# Patient Record
Sex: FEMALE | Race: WHITE
[De-identification: names, ages, dates, MRNs, and addresses within clinical notes are randomized per-mention and may not be internally consistent; named-entity substitution may affect disease eponyms.]

---

## 2018-07-02 ENCOUNTER — HOSPITAL ENCOUNTER (INPATIENT)
Dept: HOSPITAL 76 - ED | Age: 58
LOS: 4 days | Discharge: HOME | DRG: 871 | End: 2018-07-06
Attending: FAMILY MEDICINE | Admitting: INTERNAL MEDICINE
Payer: COMMERCIAL

## 2018-07-02 DIAGNOSIS — J45.21: ICD-10-CM

## 2018-07-02 DIAGNOSIS — E03.9: ICD-10-CM

## 2018-07-02 DIAGNOSIS — R73.9: ICD-10-CM

## 2018-07-02 DIAGNOSIS — I10: ICD-10-CM

## 2018-07-02 DIAGNOSIS — A41.9: Primary | ICD-10-CM

## 2018-07-02 DIAGNOSIS — N17.9: ICD-10-CM

## 2018-07-02 DIAGNOSIS — E83.42: ICD-10-CM

## 2018-07-02 DIAGNOSIS — J96.01: ICD-10-CM

## 2018-07-02 DIAGNOSIS — E86.0: ICD-10-CM

## 2018-07-02 DIAGNOSIS — Z96.659: ICD-10-CM

## 2018-07-02 DIAGNOSIS — R65.20: ICD-10-CM

## 2018-07-02 DIAGNOSIS — J18.9: ICD-10-CM

## 2018-07-02 LAB
ALBUMIN DIAFP-MCNC: 4.3 G/DL (ref 3.2–5.5)
ALBUMIN/GLOB SERPL: 1.1 {RATIO} (ref 1–2.2)
ALP SERPL-CCNC: 43 IU/L (ref 42–121)
ALT SERPL W P-5'-P-CCNC: 22 IU/L (ref 10–60)
ANION GAP SERPL CALCULATED.4IONS-SCNC: 10 MMOL/L (ref 6–13)
AST SERPL W P-5'-P-CCNC: 35 IU/L (ref 10–42)
BASOPHILS NFR BLD AUTO: 0 10^3/UL (ref 0–0.1)
BASOPHILS NFR BLD AUTO: 0.1 %
BILIRUB BLD-MCNC: 1 MG/DL (ref 0.2–1)
BUN SERPL-MCNC: 14 MG/DL (ref 6–20)
CALCIUM UR-MCNC: 9.5 MG/DL (ref 8.5–10.3)
CHLORIDE SERPL-SCNC: 98 MMOL/L (ref 101–111)
CLARITY UR REFRACT.AUTO: CLEAR
CO2 SERPL-SCNC: 27 MMOL/L (ref 21–32)
CREAT SERPLBLD-SCNC: 1.2 MG/DL (ref 0.4–1)
EOSINOPHIL # BLD AUTO: 0 10^3/UL (ref 0–0.7)
EOSINOPHIL NFR BLD AUTO: 0.4 %
ERYTHROCYTE [DISTWIDTH] IN BLOOD BY AUTOMATED COUNT: 13.5 % (ref 12–15)
GFRSERPLBLD MDRD-ARVRAT: 46 ML/MIN/{1.73_M2} (ref 89–?)
GLOBULIN SER-MCNC: 3.8 G/DL (ref 2.1–4.2)
GLUCOSE SERPL-MCNC: 179 MG/DL (ref 70–100)
GLUCOSE UR QL STRIP.AUTO: NEGATIVE MG/DL
HGB UR QL STRIP: 14.5 G/DL (ref 12–16)
KETONES UR QL STRIP.AUTO: NEGATIVE MG/DL
LIPASE SERPL-CCNC: 38 U/L (ref 22–51)
LYMPHOCYTES # SPEC AUTO: 0.1 10^3/UL (ref 1.5–3.5)
LYMPHOCYTES NFR BLD AUTO: 1.6 %
MCH RBC QN AUTO: 32.4 PG (ref 27–31)
MCHC RBC AUTO-ENTMCNC: 34 G/DL (ref 32–36)
MCV RBC AUTO: 95.2 FL (ref 81–99)
MONOCYTES # BLD AUTO: 0.1 10^3/UL (ref 0–1)
MONOCYTES NFR BLD AUTO: 0.9 %
NEUTROPHILS # BLD AUTO: 5.5 10^3/UL (ref 1.5–6.6)
NEUTROPHILS # SNV AUTO: 5.7 X10^3/UL (ref 4.8–10.8)
NEUTROPHILS NFR BLD AUTO: 97 %
NITRITE UR QL STRIP.AUTO: NEGATIVE
PDW BLD AUTO: 8.4 FL (ref 7.9–10.8)
PH UR STRIP.AUTO: 6 PH (ref 5–7.5)
PLATELET # BLD: 170 10^3/UL (ref 130–450)
PROT SPEC-MCNC: 8.1 G/DL (ref 6.7–8.2)
PROT UR STRIP.AUTO-MCNC: NEGATIVE MG/DL
RBC # UR STRIP.AUTO: (no result) /UL
RBC # URNS HPF: (no result) /HPF (ref 0–5)
RBC MAR: 4.49 10^6/UL (ref 4.2–5.4)
SODIUM SERPLBLD-SCNC: 135 MMOL/L (ref 135–145)
SP GR UR STRIP.AUTO: 1.01 (ref 1–1.03)
SQUAMOUS URNS QL MICRO: (no result)
UROBILINOGEN UR QL STRIP.AUTO: (no result) E.U./DL
UROBILINOGEN UR STRIP.AUTO-MCNC: NEGATIVE MG/DL

## 2018-07-02 PROCEDURE — 81003 URINALYSIS AUTO W/O SCOPE: CPT

## 2018-07-02 PROCEDURE — 87430 STREP A AG IA: CPT

## 2018-07-02 PROCEDURE — 87276 INFLUENZA A AG IF: CPT

## 2018-07-02 PROCEDURE — 80048 BASIC METABOLIC PNL TOTAL CA: CPT

## 2018-07-02 PROCEDURE — 87181 SC STD AGAR DILUTION PER AGT: CPT

## 2018-07-02 PROCEDURE — 87086 URINE CULTURE/COLONY COUNT: CPT

## 2018-07-02 PROCEDURE — 87040 BLOOD CULTURE FOR BACTERIA: CPT

## 2018-07-02 PROCEDURE — 87070 CULTURE OTHR SPECIMN AEROBIC: CPT

## 2018-07-02 PROCEDURE — 83036 HEMOGLOBIN GLYCOSYLATED A1C: CPT

## 2018-07-02 PROCEDURE — 99284 EMERGENCY DEPT VISIT MOD MDM: CPT

## 2018-07-02 PROCEDURE — 81001 URINALYSIS AUTO W/SCOPE: CPT

## 2018-07-02 PROCEDURE — 87077 CULTURE AEROBIC IDENTIFY: CPT

## 2018-07-02 PROCEDURE — 94664 DEMO&/EVAL PT USE INHALER: CPT

## 2018-07-02 PROCEDURE — 99285 EMERGENCY DEPT VISIT HI MDM: CPT

## 2018-07-02 PROCEDURE — 85025 COMPLETE CBC W/AUTO DIFF WBC: CPT

## 2018-07-02 PROCEDURE — 96365 THER/PROPH/DIAG IV INF INIT: CPT

## 2018-07-02 PROCEDURE — 71045 X-RAY EXAM CHEST 1 VIEW: CPT

## 2018-07-02 PROCEDURE — 83605 ASSAY OF LACTIC ACID: CPT

## 2018-07-02 PROCEDURE — 83735 ASSAY OF MAGNESIUM: CPT

## 2018-07-02 PROCEDURE — 84443 ASSAY THYROID STIM HORMONE: CPT

## 2018-07-02 PROCEDURE — 94761 N-INVAS EAR/PLS OXIMETRY MLT: CPT

## 2018-07-02 PROCEDURE — 96361 HYDRATE IV INFUSION ADD-ON: CPT

## 2018-07-02 PROCEDURE — 36415 COLL VENOUS BLD VENIPUNCTURE: CPT

## 2018-07-02 PROCEDURE — 83690 ASSAY OF LIPASE: CPT

## 2018-07-02 PROCEDURE — 87275 INFLUENZA B AG IF: CPT

## 2018-07-02 PROCEDURE — 80053 COMPREHEN METABOLIC PANEL: CPT

## 2018-07-02 PROCEDURE — 87205 SMEAR GRAM STAIN: CPT

## 2018-07-02 PROCEDURE — 94640 AIRWAY INHALATION TREATMENT: CPT

## 2018-07-02 RX ADMIN — METHYLPREDNISOLONE SODIUM SUCCINATE SCH MG: 40 INJECTION, POWDER, FOR SOLUTION INTRAMUSCULAR; INTRAVENOUS at 22:33

## 2018-07-02 RX ADMIN — POTASSIUM CHLORIDE, DEXTROSE MONOHYDRATE AND SODIUM CHLORIDE SCH MLS/HR: 150; 5; 900 INJECTION, SOLUTION INTRAVENOUS at 19:21

## 2018-07-02 RX ADMIN — SODIUM CHLORIDE, PRESERVATIVE FREE PRN ML: 5 INJECTION INTRAVENOUS at 22:34

## 2018-07-02 RX ADMIN — ACETAMINOPHEN PRN MG: 325 TABLET ORAL at 23:20

## 2018-07-02 RX ADMIN — BENZOCAINE AND MENTHOL PRN LOZENGE: 15; 3.6 LOZENGE ORAL at 19:20

## 2018-07-02 RX ADMIN — LEVALBUTEROL SCH MG: 1.25 SOLUTION RESPIRATORY (INHALATION) at 20:49

## 2018-07-02 NOTE — ED PHYSICIAN DOCUMENTATION
History of Present Illness





- Stated complaint


Stated Complaint: THROAT PX/VOMITING





- Chief complaint


Chief Complaint: Resp





- Additonal information


Additional information: 





hx from pt and SO


57 f


sick for 5 days


sore throat


productive cough


vomiting


now with lethargy


no fever


no diarrhea


no travel


denies preg


spouse with similar but milder sx


normally quite healthy





Review of Systems


Constitutional: reports: Fatigue.  denies: Fever


Throat: reports: Sore throat


Respiratory: reports: Dyspnea, Cough


GI: reports: Abdominal Pain (from cough), Vomiting


Musculoskeletal: reports: Back pain (from cough)


Neurologic: reports: Generalized weakness, Altered mental status


Endocrine: denies: Easy bruising / bleeding


Immunocompromised: denies: Immunocompromised





PD PAST MEDICAL HISTORY





- Past Medical History


Past Medical History: No





- Past Surgical History


Past Surgical History: Yes


Ortho: Knee replacement





- Allergies


Allergies/Adverse Reactions: 


 Allergies











Allergy/AdvReac Type Severity Reaction Status Date / Time


 


Sulfa (Sulfonamide Allergy  Rash Verified 07/02/18 11:26





Antibiotics)     














- Social History


Does the pt smoke?: No


Smoking Status: Never smoker


Does the pt drink ETOH?: Yes


ETOH Use: Wine


Does the pt have substance abuse?: No





- Immunizations


Immunizations are current?: Yes





PD ED PE NORMAL





- Vitals


Vital signs reviewed: Yes





- General


General: Alert and oriented X 3





- HEENT


HEENT: PERRL





- Neck


Neck: Supple, no meningeal sign, Other (tender cervical adenopathy no sig pain 

with tracheal manip)





- Cardiac


Cardiac: RRR





- Respiratory


Respiratory: Other (dec thom, coarse cough, ronchi thom)





- Abdomen


Abdomen: Soft, Other (mild )





- Derm


Derm: Normal color





- Extremities


Extremities: No deformity, No edema, No calf tenderness / cord





- Neuro


Neuro: Alert and oriented X 3, Other (but slow to respond and sleeps between 

questions)


Eye Opening: Spontaneous


Motor: Obeys Commands


Verbal: Oriented


GCS Score: 15





Results





- Vitals


Vitals: 


 Vital Signs - 24 hr











  07/02/18 07/02/18 07/02/18





  11:21 11:36 12:06


 


Temperature 36.9 C  


 


Heart Rate 120 H 90 93


 


Respiratory 28 H 20 19





Rate   


 


Blood Pressure 99/76 104/70 94/73


 


O2 Saturation 90 L 95 95














  07/02/18 07/02/18 07/02/18





  12:30 13:00 13:30


 


Temperature   


 


Heart Rate 81  81


 


Respiratory 19  17





Rate   


 


Blood Pressure 97/60 101/75 115/61


 


O2 Saturation 96  97














  07/02/18 07/02/18 07/02/18





  14:08 14:30 15:00


 


Temperature 37.8 C H  


 


Heart Rate  85 87


 


Respiratory  20 22





Rate   


 


Blood Pressure  127/61 125/66


 


O2 Saturation  97 90 L














  07/02/18 07/02/18 07/02/18





  15:25 16:45 16:55


 


Temperature  39.6 C H 


 


Heart Rate 89 99 


 


Respiratory 20 27 H 





Rate   


 


Blood Pressure  144/69 H 


 


O2 Saturation  90 L 88 L














  07/02/18





  17:30


 


Temperature 


 


Heart Rate 93


 


Respiratory 27 H





Rate 


 


Blood Pressure 153/75 H


 


O2 Saturation 95








 Oxygen











O2 Source                      Nasal cannula


 


Oxygen Flow Rate               2

















- Labs


Labs: 


 Microbiology











 07/02/18 12:45 Respiratory Culture - Preliminary





 Sputum 








 Laboratory Tests











  07/02/18 07/02/18 07/02/18





  00:30 11:35 11:35


 


WBC   5.7 


 


RBC   4.49 


 


Hgb   14.5 


 


Hct   42.8 


 


MCV   95.2 


 


MCH   32.4 H 


 


MCHC   34.0 


 


RDW   13.5 


 


Plt Count   170 


 


MPV   8.4 


 


Neut # (Auto)   5.5 


 


Lymph # (Auto)   0.1 L 


 


Mono # (Auto)   0.1 


 


Eos # (Auto)   0.0 


 


Baso # (Auto)   0.0 


 


Absolute Nucleated RBC   0.00 


 


Nucleated RBC %   0.0 


 


Sodium    135


 


Potassium    4.0


 


Chloride    98 L


 


Carbon Dioxide    27


 


Anion Gap    10.0


 


BUN    14


 


Creatinine    1.2 H


 


Estimated GFR (MDRD)    46 L


 


Glucose    179 H


 


Lactic Acid   


 


Calcium    9.5


 


Total Bilirubin    1.0


 


AST    35


 


ALT    22


 


Alkaline Phosphatase    43


 


Total Protein    8.1


 


Albumin    4.3


 


Globulin    3.8


 


Albumin/Globulin Ratio    1.1


 


Lipase    38


 


Urine Color   


 


Urine Clarity   


 


Urine pH   


 


Ur Specific Gravity   


 


Urine Protein   


 


Urine Glucose (UA)   


 


Urine Ketones   


 


Urine Occult Blood   


 


Urine Nitrite   


 


Urine Bilirubin   


 


Urine Urobilinogen   


 


Ur Leukocyte Esterase   


 


Urine RBC   


 


Urine WBC   


 


Ur Squamous Epith Cells   


 


Urine Bacteria   


 


Ur Microscopic Review   


 


Urine Culture Comments   


 


Group A Strep Rapid  Negative  














  07/02/18 07/02/18





  11:35 16:40


 


WBC  


 


RBC  


 


Hgb  


 


Hct  


 


MCV  


 


MCH  


 


MCHC  


 


RDW  


 


Plt Count  


 


MPV  


 


Neut # (Auto)  


 


Lymph # (Auto)  


 


Mono # (Auto)  


 


Eos # (Auto)  


 


Baso # (Auto)  


 


Absolute Nucleated RBC  


 


Nucleated RBC %  


 


Sodium  


 


Potassium  


 


Chloride  


 


Carbon Dioxide  


 


Anion Gap  


 


BUN  


 


Creatinine  


 


Estimated GFR (MDRD)  


 


Glucose  


 


Lactic Acid  1.5 


 


Calcium  


 


Total Bilirubin  


 


AST  


 


ALT  


 


Alkaline Phosphatase  


 


Total Protein  


 


Albumin  


 


Globulin  


 


Albumin/Globulin Ratio  


 


Lipase  


 


Urine Color   YELLOW


 


Urine Clarity   CLEAR


 


Urine pH   6.0


 


Ur Specific Gravity   1.010


 


Urine Protein   NEGATIVE


 


Urine Glucose (UA)   NEGATIVE


 


Urine Ketones   NEGATIVE


 


Urine Occult Blood   TRACE-INTA


 


Urine Nitrite   NEGATIVE


 


Urine Bilirubin   NEGATIVE


 


Urine Urobilinogen   0.2 (NORMAL)


 


Ur Leukocyte Esterase   SMALL H


 


Urine RBC   0-5


 


Urine WBC   6-10 H


 


Ur Squamous Epith Cells   RARE Squamous


 


Urine Bacteria   Few


 


Ur Microscopic Review   INDICATED


 


Urine Culture Comments   INDICATED


 


Group A Strep Rapid  














PD MEDICAL DECISION MAKING





- ED course


ED course: 





rpt exam after 30cc/kg fluid labs, pt still has not urinated


 CXR strep neg


still very ill appearing, 


still ronchi and dec on pulm exam, 


still too weak to sit up without assist


 states he had similar sx and continued to worsen until he got ab even 

though his xray was also neg - then he promptly improved - certainly sounds 

like pna on exam -so started rocephin zmax


also pt has hx wheezing and asthma so will try nebs





after more IVF ab nebs etc pt still too weak to walk safely, hypoxic to 88 % 

with exertion, tachypneic to 30 and now febrile





will not be able to dc





will admit for presumed pna based on pulm exam and  with same





spoke to hospitalist at 1705








- Sepsis Event


Vital Signs: 


 Vital Signs - 24 hr











  07/02/18 07/02/18 07/02/18





  11:21 11:36 12:06


 


Temperature 36.9 C  


 


Heart Rate 120 H 90 93


 


Respiratory 28 H 20 19





Rate   


 


Blood Pressure 99/76 104/70 94/73


 


O2 Saturation 90 L 95 95














  07/02/18 07/02/18 07/02/18





  12:30 13:00 13:30


 


Temperature   


 


Heart Rate 81  81


 


Respiratory 19  17





Rate   


 


Blood Pressure 97/60 101/75 115/61


 


O2 Saturation 96  97














  07/02/18 07/02/18 07/02/18





  14:08 14:30 15:00


 


Temperature 37.8 C H  


 


Heart Rate  85 87


 


Respiratory  20 22





Rate   


 


Blood Pressure  127/61 125/66


 


O2 Saturation  97 90 L














  07/02/18 07/02/18 07/02/18





  15:25 16:45 16:55


 


Temperature  39.6 C H 


 


Heart Rate 89 99 


 


Respiratory 20 27 H 





Rate   


 


Blood Pressure  144/69 H 


 


O2 Saturation  90 L 88 L














  07/02/18





  17:30


 


Temperature 


 


Heart Rate 93


 


Respiratory 27 H





Rate 


 


Blood Pressure 153/75 H


 


O2 Saturation 95








 Oxygen











O2 Source                      Nasal cannula


 


Oxygen Flow Rate               2

















Departure





- Departure


Disposition: 66 [facility] DC/Xfer


Clinical Impression: 


 Hypoxia, Weakness





Pneumonia


Qualifiers:


 Pneumonia type: due to unspecified organism Laterality: unspecified laterality 

Lung location: unspecified part of lung Qualified Code(s): J18.9 - Pneumonia, 

unspecified organism





Fever


Qualifiers:


 Fever type: unspecified Qualified Code(s): R50.9 - Fever, unspecified

## 2018-07-02 NOTE — XRAY REPORT
Procedure Date:  07/02/2018   

Accession Number:  [id number] / [id number]                    

Procedure:  XR  - Chest 1 View X-Ray CPT Code:  19036

 

FULL RESULT:

 

 

EXAM:

CHEST RADIOGRAPHY

 

EXAM DATE: 7/2/2018 12:08 PM.

 

CLINICAL HISTORY: Hypoxia. Tachycardia. Lethargy.

 

COMPARISON: None.

 

TECHNIQUE: Upright AP view.

 

FINDINGS:

Lungs/Pleura: No focal opacities evident. No pleural effusion. No 

pneumothorax.

 

Mediastinum: Within exam limitations, the cardiomediastinal contour is 

normal.

 

Other: Prior distal left clavicle resection. Small bone island in the 

proximal right humeral shaft.

 

IMPRESSION: Normal single view chest.

 

RADIA

## 2018-07-02 NOTE — HISTORY & PHYSICAL EXAMINATION
Chief Complaint





- Chief Complaint


Chief Complaint: Shortness of breath





History of Present Illness





- Admitted From


Admitted From:: Emergency department





- History Obtained From


Records Reviewed: Yes


History obtained from: Patient


Exam Limitations: None





- History of Present Illness


HPI Comment/Other: 





Patient is a 57-year-old female with a past medical history significant for 

hypothyroidism and exercise-induced asthma who presented to the emergency 

department with a chief complaint of shortness of breath.  The patient states 

that she was in her normal state of health until 2018 when she states she 

started feeling achy.  She states that a few days earlier her  was sick 

and was told he had a virus for which he received antibiotics.  She states that 

over the week she progressed from feeling achy to having a cough with 

productive sputum to having a sore throat to getting nausea and chills.  She 

states that although her  improved she was not getting better.  She 

states that her and her  [place] 

for the weekend.  She states that all weekend she felt miserable.  She states 

that she was weak could barely get out of bed.  She states that she had nausea 

and vomiting.  She states that her  went back to [place] but she was so 

ill that she could not go back.  She states that today she began experiencing 

shortness of breath and she got to a point where she could not breathe.  She 

states that she was having wheezing, cough and increasing dyspnea.  She states 

that she has never been admitted for an asthma exacerbation.  She denies any 

chest pain, orthopnea, PND, increased lower extremity swelling.  She states 

that at home she was having chills but did not take her temperature.  She 

denies any abdominal pain, diarrhea, urinary urgency, urinary frequency or 

dysuria.  She does admit to having decreased appetite.





Patient denies any headaches, blurred vision, runny nose, dizziness, difficulty 

swallowing, palpitations, joint pain, joint swelling, back pain, neck stiffness

, rashes, recent unintentional weight loss, night sweats or any focal 

neurologic deficits.





On presentation to the emergency department the patient was initially afebrile 

but tachycardic with a heart rate of 120, borderline hypotensive with a blood 

pressure of 99/76 and tachypneic with respiratory distress.  The patient was 

found to be hypoxic with oxygen saturation down to 90% on room air.  While in 

the emergency department the patient developed a fever with a temperature of 

39.6 and dropped her oxygen saturation down to 88%.  The patient underwent 

routine lab work which showed no leukocytosis but slight lymphopenia and a 

creatinine of 1.2 with no previous baseline.  The patient did appear to be 

significantly dehydrated and was given 4 L of IV fluid before she had any urine 

output.  The patient had an influenza swab that was negative and a group A 

strep that was negative.  The patient underwent a chest x-ray which was normal.

  The patient continued to have significant respiratory distress and was 

persistently hypoxic in the emergency department.  She did appear to have 

wheezes on examination and perhaps had some asthma exacerbation.  Given her 

hypoxia and borderline septic appearance the patient was admitted to the 

hospital for treatment of acute respiratory failure with hypoxia, asthma 

exacerbation and a clinical pneumonia.





History





- Past Medical History


Cardiovascular: reports: None


Respiratory: reports: Asthma


Neuro: reports: None


Endocrine/Autoimmune: reports: HyPOthyroidism


GI: reports: None


GYN: reports: None


: reports: None


HEENT: reports: None


Psych: reports: None


Musculoskeletal: reports: None


Derm: reports: None





- Past Surgical History


Ortho: reports: Other





- Family & Social History


Family History: Mother: , Alzheimer's Disease, CAD, Cancer, Father: 

, Sister: Alive and Well


Living arrangement: At home


Living Situation: With spouse/s.o.


Social History Notes: The patient lives in [place] and is originally from the 

[place] area.  She works as a  for [employer].  [place] 

[place].  She has 1 

daughter and a granddaughter.  She lives with her .  She does not smoke 

cigarettes.  She drinks 1-2 glasses of wine a night.  She denies any illicit 

drug use.





- POLST


Patient has POLST: No


POLST Status: Full Code





Meds/Allgy





- Allergies


Allergies/Adverse Reactions: 


 Allergies











Allergy/AdvReac Type Severity Reaction Status Date / Time


 


Sulfa (Sulfonamide Allergy  Rash Verified 18 11:26





Antibiotics)     














Review of Systems





- Other Findings


Other Findings: 





A comprehensive review of systems was performed the pertinent positives and 

negatives are stated above in the HPI and the remainder of the review of 

systems is negative.





Exam





- Vital Signs


Reviewed Vital Signs: Yes


Vital Signs: 





 Vital Signs x48h











  Temp Pulse Pulse Resp BP BP Pulse Ox


 


 18 20:51   90   20   


 


 18 19:31  37.2 C      


 


 07/02/18 18:45  38.9 C H   102 H  24   101/80  92


 


 18 18:00   106 H   22  129/69   93














- Physical Exam


General Appearance: positive: Alert, Moderate distress (Patient appears flushed

, ill-appearing, tachypneic and very worn out.)


Eyes Bilateral: positive: Normal inspection, PERRL, EOMI, No lid inflammation, 

Conjunctivae nml, No scleral icterus


ENT: positive: ENT inspection nml, Pharynx nml, Dry mucous membranes.  negative

: Purulent nasal drainage, Pharyngeal erythema, Oral lesions


Neck: positive: Nml inspection, Thyroid nml, No JVD, Trachea midline.  negative

: Thyromegaly, Lymphadenopathy (R), Lymphadenopathy (L), Stiff neck, Carotid 

bruit, Tracheal deviation


Respiratory: positive: Chest non-tender, Wheezes (Scattered bilaterally 

throughout the lungs), Rhonchi (Rhonchi bilaterally at bases), Other (

Tachypneic with respiratory distress)


Cardiovascular: positive: No murmur, No gallop, Tachycardia


Peripheral Pulses: positive: 2+


Abdomen: positive: Non-tender, No organomegaly, Nml bowel sounds, No 

distention.  negative: Guarding, Rebound, Hepatomegaly


Back: positive: Nml inspection.  negative: CVA tenderness (R), CVA tenderness (L

)


Skin: positive: Color nml, No rash, Warm.  negative: Cyanosis, Diaphoresis, 

Pallor


Extremities: positive: Non-tender, Full ROM, Nml appearance, No pedal edema


Neurologic/Psychiatric: positive: Oriented x3, CN's nml (2-12), Motor nml, 

Sensation nml, Mood/affect nml, Other (Appear to have generalized weakness)





Conclusion/Plan





- Problem List


(1) Acute respiratory failure with hypoxia


Conclusion/Plan: 


On presentation to the emergency department the patient was in acute 

respiratory distress with respiratory rate in the high 20s, tachycardia and was 

hypoxic down to as little as 88% on room air.  The patient was using accessory 

muscles of breathing and spiked a fever while she was in the emergency 

department.  The patient was very ill-appearing on presentation.  Patient's 

chest x-ray did not show any evidence of pneumonia.  She was very dehydrated 

and received several liters of IV fluid.  The patient was found to have 

wheezing on examination along with rhonchi concerning for asthma exacerbation 

and pneumonia.  The patient was placed on 2 L of oxygen with which oxygen 

saturation did improve up to 95%.


Patient has acute respiratory failure with hypoxia likely secondary to a 

combination of asthma exacerbation and pneumonia.  Although the patient's chest 

x-ray does not show pneumonia she clinically appears to have pneumonia given 

her fever, respiratory distress and rhonchi on examination.





Plan:


Patient will be placed on supplemental oxygen


Patient will be given IV antibiotics with ceftriaxone and azithromycin


Patient will be placed on Solu-Medrol 40 mg 3 times daily


Patient will be placed on Xopenex 4 times daily


We will monitor closely








(2) Sepsis


Conclusion/Plan: 





On presentation to the emergency department the patient appeared to be septic 

with fever of up to 38.9, tachycardia up to 120, respiratory distress with 

respiratory rate up to 28, hypoxia and dehydration with acute kidney injury.  

Although the patient's chest x-ray was negative for pneumonia she clinically 

appears to have pneumonia and this is the likely source of the patient's 

sepsis.  Patient's lactic acid was normal.





Plan:


Aggressive hydration with IV fluids


IV antibiotics with ceftriaxone and azithromycin to cover for community 

acquired pneumonia


Supplemental oxygen


Blood cultures


Qualifiers: 


   Sepsis type: sepsis due to unspecified organism   Qualified Code(s): A41.9 - 

Sepsis, unspecified organism   





(3) CAP (community acquired pneumonia)


Conclusion/Plan: 





Patient presented with acute respiratory failure with hypoxia and sepsis.  

Although patient's chest x-ray was negative on presentation she is febrile, 

tachycardic and had respiratory distress with use of accessory muscles of 

breathing and was tachypneic up to 28.  Patient had rhonchi on examination of 

her lungs.  Patient appears to clinically have community-acquired pneumonia





Plan:


IV antibiotics with ceftriaxone and azithromycin


IV fluids


Blood cultures pending


Supplemental oxygen


Repeat chest x-ray once patient is hydrated in the morning


Qualifiers: 


   Laterality: unspecified laterality   Qualified Code(s): J18.9 - Pneumonia, 

unspecified organism   





(4) Asthma exacerbation


Conclusion/Plan: 


Patient presented with acute respiratory failure with hypoxia and sepsis.  The 

patient appears to have community acquired pneumonia which looks to be 

exacerbating asthma.  The patient was wheezing on examination and was hypoxic.  

She had some mild relief with nebulizer treatment.





Plan:


Place on Xopenex 4 times daily


Solu-Medrol IV 3 times daily


Supplemental oxygen


IV antibiotics for treatment of community acquired pneumonia


Monitor closely


Qualifiers: 


   Asthma severity: mild   Asthma persistence: intermittent   Qualified Code(s)

: J45.21 - Mild intermittent asthma with (acute) exacerbation   





(5) GLORY (acute kidney injury)


Conclusion/Plan: 


We do not have a previous creatinine on the patient however assuming that she 

is healthy and previously had a normal creatinine level her creatinine is 

elevated at 1.2 on presentation.  She appeared very dehydrated on examination.  

She did not make any urine until she received 4 L of IV fluid.  She appears to 

have acute kidney injury secondary to sepsis.





Plan:


Give IV fluids


Monitor creatinine


Avoid nephrotoxic agents











(6) Hyperglycemia


Conclusion/Plan: 


Patient denies any history of diabetes or any family history of diabetes.  The 

patient on presentation is hyperglycemic with a glucose of 179.  Glucose may be 

elevated due to stress response or if the patient received steroids prior to 

the blood glucose check.  For now we will get a hemoglobin A1c and if it is 

elevated then we will place her on glucose checks and possibly sliding scale 

insulin while she is hospitalized here.








(7) Hypothyroidism


Conclusion/Plan: 


Patient has history of hypothyroidism and is on Synthroid at home.  The patient 

cannot remember her dose of Synthroid.  We will have the pharmacy confirm her 

dose of Synthroid and then start her back on it once we have confirmed.  

Currently the patient appears to be stable, we will check a TSH in the morning.


Qualifiers: 


   Hypothyroidism type: unspecified   Qualified Code(s): E03.9 - Hypothyroidism

, unspecified   





- Lab Results


Lab results reviewed: Yes


Fish Bones: 


 18 11:35





 18 11:35


Other Lab Results: 








 Laboratory Results











WBC  5.7 x10^3/uL (4.8-10.8)   18  11:35    


 


RBC  4.49 10^6/uL (4.20-5.40)   18  11:35    


 


Hgb  14.5 g/dL (12.0-16.0)   18  11:35    


 


Hct  42.8 % (37.0-47.0)   18  11:35    


 


MCV  95.2 fL (81.0-99.0)   18  11:35    


 


MCH  32.4 pg (27.0-31.0)  H  18  11:35    


 


MCHC  34.0 g/dL (32.0-36.0)   18  11:35    


 


RDW  13.5 % (12.0-15.0)   18  11:35    


 


Plt Count  170 10^3/uL (130-450)   18  11:35    


 


MPV  8.4 fL (7.9-10.8)   18  11:35    


 


Neut # (Auto)  5.5 10^3/uL (1.5-6.6)   18  11:35    


 


Lymph # (Auto)  0.1 10^3/uL (1.5-3.5)  L  18  11:35    


 


Mono # (Auto)  0.1 10^3/uL (0.0-1.0)   18  11:35    


 


Eos # (Auto)  0.0 10^3/uL (0.0-0.7)   18  11:35    


 


Baso # (Auto)  0.0 10^3/uL (0.0-0.1)   18  11:35    


 


Absolute Nucleated RBC  0.00 x10^3/uL  18  11:35    


 


Nucleated RBC %  0.0 /100WBC  18  11:35    


 


Sodium  135 mmol/L (135-145)   18  11:35    


 


Potassium  4.0 mmol/L (3.5-5.0)   18  11:35    


 


Chloride  98 mmol/L (101-111)  L  18  11:35    


 


Carbon Dioxide  27 mmol/L (21-32)   18  11:35    


 


Anion Gap  10.0  (6-13)   18  11:35    


 


BUN  14 mg/dL (6-20)   18  11:35    


 


Creatinine  1.2 mg/dL (0.4-1.0)  H  18  11:35    


 


Estimated GFR (MDRD)  46  (>89)  L  18  11:35    


 


Glucose  179 mg/dL ()  H  18  11:35    


 


Lactic Acid  1.5 mmol/L (0.5-2.2)   18  11:35    


 


Calcium  9.5 mg/dL (8.5-10.3)   18  11:35    


 


Total Bilirubin  1.0 mg/dL (0.2-1.0)   18  11:35    


 


AST  35 IU/L (10-42)   18  11:35    


 


ALT  22 IU/L (10-60)   18  11:35    


 


Alkaline Phosphatase  43 IU/L ()   18  11:35    


 


Total Protein  8.1 g/dL (6.7-8.2)   18  11:35    


 


Albumin  4.3 g/dL (3.2-5.5)   18  11:35    


 


Globulin  3.8 g/dL (2.1-4.2)   18  11:35    


 


Albumin/Globulin Ratio  1.1  (1.0-2.2)   18  11:35    


 


Lipase  38 U/L (22-51)   18  11:35    


 


Urine Color  YELLOW   18  16:40    


 


Urine Clarity  CLEAR  (CLEAR)   18  16:40    


 


Urine pH  6.0 PH (5.0-7.5)   18  16:40    


 


Ur Specific Gravity  1.010  (1.002-1.030)   18  16:40    


 


Urine Protein  NEGATIVE mg/dL (NEGATIVE)   18  16:40    


 


Urine Glucose (UA)  NEGATIVE mg/dL (NEGATIVE)   18  16:40    


 


Urine Ketones  NEGATIVE mg/dL (NEGATIVE)   18  16:40    


 


Urine Occult Blood  TRACE-INTA  (NEGATIVE)   18  16:40    


 


Urine Nitrite  NEGATIVE  (NEGATIVE)   18  16:40    


 


Urine Bilirubin  NEGATIVE  (NEGATIVE)   18  16:40    


 


Urine Urobilinogen  0.2 (NORMAL) E.U./dL (NORMAL)   18  16:40    


 


Ur Leukocyte Esterase  SMALL  (NEGATIVE)  H  18  16:40    


 


Urine RBC  0-5 /HPF (0-5)   18  16:40    


 


Urine WBC  6-10 /HPF (0-5)  H  18  16:40    


 


Ur Squamous Epith Cells  RARE Squamous  (<= Few)   18  16:40    


 


Urine Bacteria  Few /HPF (None Seen)   18  16:40    


 


Ur Microscopic Review  INDICATED   18  16:40    


 


Urine Culture Comments  INDICATED   18  16:40    


 


Influenza A (Rapid)  Negative  (Negative)   18  19:32    


 


Influenza B (Rapid)  Negative  (Negative)   18  19:32    


 


Group A Strep Rapid  Negative  (Negative)   18  00:30    














- Diagnostic Imaging Results


Diagnostic Imaging Results: positive: Final report reviewed


Diagnostic Imaging Results Comments: 





Chest x-ray


Impression:


Normal single view chest





Core Measures





- Anticipated LOS


I expect patient to be DC'd or transferred within 96 hours.: Yes





- DVT/VTE - Prophylaxis


VTE/DVT Prophylaxis med ordered at admit?: Yes

## 2018-07-03 LAB
ANION GAP SERPL CALCULATED.4IONS-SCNC: 7 MMOL/L (ref 6–13)
BASOPHILS NFR BLD AUTO: 0 10^3/UL (ref 0–0.1)
BASOPHILS NFR BLD AUTO: 0.2 %
BUN SERPL-MCNC: 21 MG/DL (ref 6–20)
CALCIUM UR-MCNC: 7 MG/DL (ref 8.5–10.3)
CHLORIDE SERPL-SCNC: 106 MMOL/L (ref 101–111)
CO2 SERPL-SCNC: 21 MMOL/L (ref 21–32)
CREAT SERPLBLD-SCNC: 1.3 MG/DL (ref 0.4–1)
EOSINOPHIL # BLD AUTO: 0 10^3/UL (ref 0–0.7)
EOSINOPHIL NFR BLD AUTO: 0 %
ERYTHROCYTE [DISTWIDTH] IN BLOOD BY AUTOMATED COUNT: 13.5 % (ref 12–15)
EST. AVERAGE GLUCOSE BLD GHB EST-MCNC: 103 MG/DL (ref 70–100)
GFRSERPLBLD MDRD-ARVRAT: 42 ML/MIN/{1.73_M2} (ref 89–?)
GLUCOSE SERPL-MCNC: 170 MG/DL (ref 70–100)
HB2 TOTAL: 13.3 G/DL
HBA1C BLD-MCNC: 0.45 G/DL
HEMOGLOBIN A1C %: 5.2 % (ref 4.6–6.2)
HGB UR QL STRIP: 12.7 G/DL (ref 12–16)
LYMPHOCYTES # SPEC AUTO: 0.1 10^3/UL (ref 1.5–3.5)
LYMPHOCYTES NFR BLD AUTO: 1.3 %
MAGNESIUM SERPL-MCNC: 1.3 MG/DL (ref 1.7–2.8)
MCH RBC QN AUTO: 32.5 PG (ref 27–31)
MCHC RBC AUTO-ENTMCNC: 33.8 G/DL (ref 32–36)
MCV RBC AUTO: 96 FL (ref 81–99)
MONOCYTES # BLD AUTO: 0.2 10^3/UL (ref 0–1)
MONOCYTES NFR BLD AUTO: 2.6 %
NEUTROPHILS # BLD AUTO: 7.2 10^3/UL (ref 1.5–6.6)
NEUTROPHILS # SNV AUTO: 7.5 X10^3/UL (ref 4.8–10.8)
NEUTROPHILS NFR BLD AUTO: 95.9 %
PDW BLD AUTO: 8.5 FL (ref 7.9–10.8)
PLATELET # BLD: 103 10^3/UL (ref 130–450)
RBC MAR: 3.9 10^6/UL (ref 4.2–5.4)
SODIUM SERPLBLD-SCNC: 134 MMOL/L (ref 135–145)

## 2018-07-03 RX ADMIN — LEVALBUTEROL SCH MG: 1.25 SOLUTION RESPIRATORY (INHALATION) at 10:57

## 2018-07-03 RX ADMIN — METHYLPREDNISOLONE SODIUM SUCCINATE SCH MG: 40 INJECTION, POWDER, FOR SOLUTION INTRAMUSCULAR; INTRAVENOUS at 14:46

## 2018-07-03 RX ADMIN — BENZOCAINE AND MENTHOL PRN LOZENGE: 15; 3.6 LOZENGE ORAL at 01:02

## 2018-07-03 RX ADMIN — LEVALBUTEROL SCH: 1.25 SOLUTION RESPIRATORY (INHALATION) at 21:55

## 2018-07-03 RX ADMIN — SODIUM CHLORIDE, PRESERVATIVE FREE SCH: 5 INJECTION INTRAVENOUS at 17:04

## 2018-07-03 RX ADMIN — ACETAMINOPHEN PRN MG: 325 TABLET ORAL at 04:35

## 2018-07-03 RX ADMIN — METHYLPREDNISOLONE SODIUM SUCCINATE SCH MG: 40 INJECTION, POWDER, FOR SOLUTION INTRAMUSCULAR; INTRAVENOUS at 06:29

## 2018-07-03 RX ADMIN — MAGNESIUM SULFATE HEPTAHYDRATE SCH MLS/HR: 2 INJECTION, SOLUTION INTRAVENOUS at 09:15

## 2018-07-03 RX ADMIN — FAMOTIDINE SCH MG: 20 TABLET, FILM COATED ORAL at 11:49

## 2018-07-03 RX ADMIN — MAGNESIUM SULFATE HEPTAHYDRATE SCH MLS/HR: 2 INJECTION, SOLUTION INTRAVENOUS at 08:11

## 2018-07-03 RX ADMIN — POTASSIUM CHLORIDE, DEXTROSE MONOHYDRATE AND SODIUM CHLORIDE SCH MLS/HR: 150; 5; 900 INJECTION, SOLUTION INTRAVENOUS at 06:59

## 2018-07-03 RX ADMIN — DEXTROSE MONOHYDRATE SCH MLS/HR: 50 INJECTION, SOLUTION INTRAVENOUS at 14:46

## 2018-07-03 RX ADMIN — SODIUM CHLORIDE, PRESERVATIVE FREE SCH: 5 INJECTION INTRAVENOUS at 07:26

## 2018-07-03 RX ADMIN — SODIUM CHLORIDE, PRESERVATIVE FREE SCH: 5 INJECTION INTRAVENOUS at 00:53

## 2018-07-03 RX ADMIN — LEVALBUTEROL SCH MG: 1.25 SOLUTION RESPIRATORY (INHALATION) at 07:29

## 2018-07-03 RX ADMIN — METHYLPREDNISOLONE SODIUM SUCCINATE SCH MG: 40 INJECTION, POWDER, FOR SOLUTION INTRAMUSCULAR; INTRAVENOUS at 21:48

## 2018-07-03 RX ADMIN — ACETAMINOPHEN PRN MG: 325 TABLET ORAL at 11:48

## 2018-07-03 RX ADMIN — SODIUM CHLORIDE SCH MLS/HR: 9 INJECTION, SOLUTION INTRAVENOUS at 13:17

## 2018-07-03 RX ADMIN — BENZOCAINE AND MENTHOL PRN LOZENGE: 15; 3.6 LOZENGE ORAL at 11:48

## 2018-07-03 RX ADMIN — Medication SCH MG: at 11:48

## 2018-07-03 RX ADMIN — SODIUM CHLORIDE, PRESERVATIVE FREE PRN ML: 5 INJECTION INTRAVENOUS at 06:30

## 2018-07-03 RX ADMIN — LEVALBUTEROL SCH MG: 1.25 SOLUTION RESPIRATORY (INHALATION) at 14:51

## 2018-07-03 NOTE — XRAY REPORT
Procedure Date:  07/03/2018   

Accession Number:  [id number] / [id number]                    

Procedure:  XR  - Chest 1 View X-Ray CPT Code:  09853

 

FULL RESULT:

 

 

EXAM: Chest 1 View X-Ray

 

DATE: 7/3/2018 9:08 AM

 

CLINICAL HISTORY: F/U SOB, poss pneumonia

 

COMPARISON: None.

 

TECHNIQUE: Single view of the chest.

 

FINDINGS:

Lungs/Pleura: Left basilar infiltrate with trace effusion. No 

pneumothorax.

 

Mediastinum: Within exam limitations, cardiomediastinal contour is normal.

 

Other: None.

 

IMPRESSION:

New left basilar infiltrate and trace effusion.

 

RADIA

## 2018-07-04 LAB
ANION GAP SERPL CALCULATED.4IONS-SCNC: 4 MMOL/L (ref 6–13)
BASOPHILS NFR BLD AUTO: 0 10^3/UL (ref 0–0.1)
BASOPHILS NFR BLD AUTO: 0.4 %
BUN SERPL-MCNC: 13 MG/DL (ref 6–20)
CALCIUM UR-MCNC: 7.2 MG/DL (ref 8.5–10.3)
CHLORIDE SERPL-SCNC: 108 MMOL/L (ref 101–111)
CO2 SERPL-SCNC: 22 MMOL/L (ref 21–32)
CREAT SERPLBLD-SCNC: 0.8 MG/DL (ref 0.4–1)
EOSINOPHIL # BLD AUTO: 0 10^3/UL (ref 0–0.7)
EOSINOPHIL NFR BLD AUTO: 0 %
ERYTHROCYTE [DISTWIDTH] IN BLOOD BY AUTOMATED COUNT: 13.9 % (ref 12–15)
GFRSERPLBLD MDRD-ARVRAT: 73 ML/MIN/{1.73_M2} (ref 89–?)
GLUCOSE SERPL-MCNC: 194 MG/DL (ref 70–100)
HGB UR QL STRIP: 11.3 G/DL (ref 12–16)
LYMPHOCYTES # SPEC AUTO: 0.5 10^3/UL (ref 1.5–3.5)
LYMPHOCYTES NFR BLD AUTO: 6.7 %
MAGNESIUM SERPL-MCNC: 2.4 MG/DL (ref 1.7–2.8)
MCH RBC QN AUTO: 32.6 PG (ref 27–31)
MCHC RBC AUTO-ENTMCNC: 33.6 G/DL (ref 32–36)
MCV RBC AUTO: 96.8 FL (ref 81–99)
MONOCYTES # BLD AUTO: 0.2 10^3/UL (ref 0–1)
MONOCYTES NFR BLD AUTO: 2.9 %
NEUTROPHILS # BLD AUTO: 7.1 10^3/UL (ref 1.5–6.6)
NEUTROPHILS # SNV AUTO: 7.9 X10^3/UL (ref 4.8–10.8)
NEUTROPHILS NFR BLD AUTO: 90 %
PDW BLD AUTO: 8.6 FL (ref 7.9–10.8)
PLATELET # BLD: 82 10^3/UL (ref 130–450)
RBC MAR: 3.47 10^6/UL (ref 4.2–5.4)
SODIUM SERPLBLD-SCNC: 134 MMOL/L (ref 135–145)

## 2018-07-04 RX ADMIN — ACETAMINOPHEN PRN MG: 325 TABLET ORAL at 06:05

## 2018-07-04 RX ADMIN — ALBUTEROL SULFATE PRN MG: 2.5 SOLUTION RESPIRATORY (INHALATION) at 08:24

## 2018-07-04 RX ADMIN — METHYLPREDNISOLONE SODIUM SUCCINATE SCH MG: 40 INJECTION, POWDER, FOR SOLUTION INTRAMUSCULAR; INTRAVENOUS at 06:02

## 2018-07-04 RX ADMIN — SODIUM CHLORIDE, PRESERVATIVE FREE SCH: 5 INJECTION INTRAVENOUS at 09:44

## 2018-07-04 RX ADMIN — POTASSIUM CHLORIDE, DEXTROSE MONOHYDRATE AND SODIUM CHLORIDE SCH MLS/HR: 150; 5; 900 INJECTION, SOLUTION INTRAVENOUS at 14:30

## 2018-07-04 RX ADMIN — ALBUTEROL SULFATE PRN MG: 2.5 SOLUTION RESPIRATORY (INHALATION) at 12:34

## 2018-07-04 RX ADMIN — DEXTROSE MONOHYDRATE SCH MLS/HR: 50 INJECTION, SOLUTION INTRAVENOUS at 10:50

## 2018-07-04 RX ADMIN — FAMOTIDINE SCH MG: 20 TABLET, FILM COATED ORAL at 09:43

## 2018-07-04 RX ADMIN — METHYLPREDNISOLONE SODIUM SUCCINATE SCH MG: 40 INJECTION, POWDER, FOR SOLUTION INTRAMUSCULAR; INTRAVENOUS at 14:30

## 2018-07-04 RX ADMIN — ACETAMINOPHEN PRN MG: 325 TABLET ORAL at 15:55

## 2018-07-04 RX ADMIN — Medication SCH MG: at 09:43

## 2018-07-04 RX ADMIN — SODIUM CHLORIDE, PRESERVATIVE FREE SCH: 5 INJECTION INTRAVENOUS at 00:17

## 2018-07-04 RX ADMIN — POTASSIUM CHLORIDE, DEXTROSE MONOHYDRATE AND SODIUM CHLORIDE SCH MLS/HR: 150; 5; 900 INJECTION, SOLUTION INTRAVENOUS at 00:16

## 2018-07-04 RX ADMIN — SODIUM CHLORIDE SCH MLS/HR: 9 INJECTION, SOLUTION INTRAVENOUS at 09:44

## 2018-07-04 RX ADMIN — SODIUM CHLORIDE, PRESERVATIVE FREE SCH: 5 INJECTION INTRAVENOUS at 15:55

## 2018-07-04 RX ADMIN — ACETAMINOPHEN PRN MG: 325 TABLET ORAL at 00:31

## 2018-07-04 RX ADMIN — METHYLPREDNISOLONE SODIUM SUCCINATE SCH MG: 40 INJECTION, POWDER, FOR SOLUTION INTRAMUSCULAR; INTRAVENOUS at 22:01

## 2018-07-05 LAB
ANION GAP SERPL CALCULATED.4IONS-SCNC: 6 MMOL/L (ref 6–13)
BASOPHILS NFR BLD AUTO: 0 %
BASOPHILS NFR BLD AUTO: 0 10^3/UL (ref 0–0.1)
BUN SERPL-MCNC: 13 MG/DL (ref 6–20)
CALCIUM UR-MCNC: 7.5 MG/DL (ref 8.5–10.3)
CHLORIDE SERPL-SCNC: 108 MMOL/L (ref 101–111)
CLARITY UR REFRACT.AUTO: CLEAR
CO2 SERPL-SCNC: 21 MMOL/L (ref 21–32)
CREAT SERPLBLD-SCNC: 0.6 MG/DL (ref 0.4–1)
EOSINOPHIL # BLD AUTO: 0 10^3/UL (ref 0–0.7)
EOSINOPHIL NFR BLD AUTO: 0 %
ERYTHROCYTE [DISTWIDTH] IN BLOOD BY AUTOMATED COUNT: 14.1 % (ref 12–15)
GFRSERPLBLD MDRD-ARVRAT: 102 ML/MIN/{1.73_M2} (ref 89–?)
GLUCOSE SERPL-MCNC: 227 MG/DL (ref 70–100)
GLUCOSE UR QL STRIP.AUTO: 500 MG/DL
HGB UR QL STRIP: 11.1 G/DL (ref 12–16)
KETONES UR QL STRIP.AUTO: NEGATIVE MG/DL
LYMPHOCYTES # SPEC AUTO: 0.9 10^3/UL (ref 1.5–3.5)
LYMPHOCYTES NFR BLD AUTO: 12.7 %
MAGNESIUM SERPL-MCNC: 2.3 MG/DL (ref 1.7–2.8)
MCH RBC QN AUTO: 32.5 PG (ref 27–31)
MCHC RBC AUTO-ENTMCNC: 33.8 G/DL (ref 32–36)
MCV RBC AUTO: 95.9 FL (ref 81–99)
MONOCYTES # BLD AUTO: 0.2 10^3/UL (ref 0–1)
MONOCYTES NFR BLD AUTO: 3.5 %
NEUTROPHILS # BLD AUTO: 5.9 10^3/UL (ref 1.5–6.6)
NEUTROPHILS # SNV AUTO: 7.1 X10^3/UL (ref 4.8–10.8)
NEUTROPHILS NFR BLD AUTO: 83.8 %
NITRITE UR QL STRIP.AUTO: NEGATIVE
PDW BLD AUTO: 9.5 FL (ref 7.9–10.8)
PH UR STRIP.AUTO: 6 PH (ref 5–7.5)
PLATELET # BLD: 83 10^3/UL (ref 130–450)
PROT UR STRIP.AUTO-MCNC: NEGATIVE MG/DL
RBC # UR STRIP.AUTO: (no result) /UL
RBC # URNS HPF: (no result) /HPF (ref 0–5)
RBC MAR: 3.41 10^6/UL (ref 4.2–5.4)
SODIUM SERPLBLD-SCNC: 135 MMOL/L (ref 135–145)
SP GR UR STRIP.AUTO: 1.01 (ref 1–1.03)
SQUAMOUS #/AREA URNS HPF: (no result) /HPF
SQUAMOUS URNS QL MICRO: (no result)
UROBILINOGEN UR QL STRIP.AUTO: (no result) E.U./DL
UROBILINOGEN UR STRIP.AUTO-MCNC: NEGATIVE MG/DL

## 2018-07-05 RX ADMIN — METHYLPREDNISOLONE SODIUM SUCCINATE SCH MG: 40 INJECTION, POWDER, FOR SOLUTION INTRAMUSCULAR; INTRAVENOUS at 05:38

## 2018-07-05 RX ADMIN — SODIUM CHLORIDE, PRESERVATIVE FREE SCH: 5 INJECTION INTRAVENOUS at 23:18

## 2018-07-05 RX ADMIN — GUAIFENESIN SCH MG: 100 SOLUTION ORAL at 22:01

## 2018-07-05 RX ADMIN — SODIUM CHLORIDE, PRESERVATIVE FREE PRN ML: 5 INJECTION INTRAVENOUS at 18:55

## 2018-07-05 RX ADMIN — FAMOTIDINE SCH MG: 20 TABLET, FILM COATED ORAL at 09:11

## 2018-07-05 RX ADMIN — GUAIFENESIN SCH MG: 100 SOLUTION ORAL at 13:33

## 2018-07-05 RX ADMIN — SODIUM CHLORIDE, PRESERVATIVE FREE SCH: 5 INJECTION INTRAVENOUS at 09:17

## 2018-07-05 RX ADMIN — DEXTROSE MONOHYDRATE SCH MLS/HR: 50 INJECTION, SOLUTION INTRAVENOUS at 10:12

## 2018-07-05 RX ADMIN — SODIUM CHLORIDE, PRESERVATIVE FREE SCH: 5 INJECTION INTRAVENOUS at 16:59

## 2018-07-05 RX ADMIN — METHYLPREDNISOLONE SODIUM SUCCINATE SCH MG: 40 INJECTION, POWDER, FOR SOLUTION INTRAMUSCULAR; INTRAVENOUS at 13:43

## 2018-07-05 RX ADMIN — POTASSIUM CHLORIDE, DEXTROSE MONOHYDRATE AND SODIUM CHLORIDE SCH MLS/HR: 150; 5; 900 INJECTION, SOLUTION INTRAVENOUS at 02:17

## 2018-07-05 RX ADMIN — ALBUTEROL SULFATE PRN MG: 2.5 SOLUTION RESPIRATORY (INHALATION) at 07:39

## 2018-07-05 RX ADMIN — POTASSIUM CHLORIDE, DEXTROSE MONOHYDRATE AND SODIUM CHLORIDE SCH MLS/HR: 150; 5; 900 INJECTION, SOLUTION INTRAVENOUS at 14:25

## 2018-07-05 RX ADMIN — ALBUTEROL SULFATE PRN MG: 2.5 SOLUTION RESPIRATORY (INHALATION) at 13:07

## 2018-07-05 RX ADMIN — ALBUTEROL SULFATE PRN MG: 2.5 SOLUTION RESPIRATORY (INHALATION) at 08:44

## 2018-07-05 RX ADMIN — SODIUM CHLORIDE, PRESERVATIVE FREE SCH: 5 INJECTION INTRAVENOUS at 00:09

## 2018-07-05 RX ADMIN — Medication SCH MG: at 09:11

## 2018-07-05 RX ADMIN — SODIUM CHLORIDE SCH MLS/HR: 9 INJECTION, SOLUTION INTRAVENOUS at 09:12

## 2018-07-05 NOTE — XRAY REPORT
Procedure Date:  07/05/2018   

Accession Number:  [id number] / [id number]                    

Procedure:  XR  - Chest 1 View X-Ray CPT Code:  58407

 

FULL RESULT:

 

 

EXAM:

CHEST RADIOGRAPHY

 

EXAM DATE: 7/5/2018 06:30 AM.

 

CLINICAL HISTORY: Reassess community-acquired pneumonia.

 

COMPARISON: 07/03/2018. 07/02/2018.

 

TECHNIQUE: 1 view.

 

FINDINGS:

Lungs/Pleura: Slight progression of left greater than right lower lobe 

airspace consolidation. No vascular congestion. No pneumothorax.

 

Mediastinum: Heart size upper normal. Aorta is tortuous.

 

Other: None.

 

IMPRESSION:

1. Slight progression of left greater than right lower lobe airspace 

consolidation.

 

RADIA

## 2018-07-06 VITALS — DIASTOLIC BLOOD PRESSURE: 87 MMHG | SYSTOLIC BLOOD PRESSURE: 144 MMHG

## 2018-07-06 RX ADMIN — SODIUM CHLORIDE, PRESERVATIVE FREE SCH: 5 INJECTION INTRAVENOUS at 09:07

## 2018-07-06 RX ADMIN — FAMOTIDINE SCH MG: 20 TABLET, FILM COATED ORAL at 09:06

## 2018-07-06 RX ADMIN — Medication SCH MG: at 09:06

## 2018-07-06 RX ADMIN — GUAIFENESIN SCH MG: 100 SOLUTION ORAL at 05:29

## 2018-07-06 NOTE — DISCHARGE SUMMARY
Discharge Summary


Admit Date: 07/02/18


Discharge Date: 07/06/18


Discharging Provider: Kathrine Choi DO


Primary Care Provider: Dory Choi MD


Code Status: Attempt Resuscitation


Condition at Discharge: Fair


Discharge Disposition: 01 Home, Self Care





- DIAGNOSES


Admission Diagnoses: 


1.  Acute respiratory failure with hypoxia 


2.  Sepsis 


3.  Community-acquired pneumonia 


4.  Asthma exacerbation 


5.  Acute kidney injury 


6.  Hyperglycemia 


7.  Hypothyroidism





Discharge Diagnoses with Status of Each Condition: 


1.  Acute respiratory failure with hypoxia 


2.  Sepsis 


3.  Community-acquired pneumonia 


4.  Asthma exacerbation 


5.  Acute kidney injury 


6.  Hyperglycemia 


7.  Hypothyroidism











- HPI


History of Present Illness: 








Patient is a 57-year-old female with a past medical history significant for 

hypothyroidism and exercise-induced asthma who presented to the emergency 

department with a chief complaint of shortness of breath.  The patient states 

that she was in her normal state of health until 6/27/2018 when she states she 

started feeling achy.  She states that a few days earlier her  was sick 

and was told he had a virus for which he received antibiotics.  She states that 

over the week she progressed from feeling achy to having a cough with 

productive sputum to having a sore throat to getting nausea and chills.  She 

states that although her  improved she was not getting better.  She 

states that her and her  came to [facility] to their vacation home 

for the weekend.  She states that all weekend she felt miserable.  She states 

that she was weak could barely get out of bed.  She states that she had nausea 

and vomiting.  She states that her  went back to [facility] but she was so 

ill that she could not go back.  She states that today she began experiencing 

shortness of breath and she got to a point where she could not breathe.  She 

states that she was having wheezing, cough and increasing dyspnea.  She states 

that she has never been admitted for an asthma exacerbation.  She denies any 

chest pain, orthopnea, PND, increased lower extremity swelling.  She states 

that at home she was having chills but did not take her temperature.  She 

denies any abdominal pain, diarrhea, urinary urgency, urinary frequency or 

dysuria.  She does admit to having decreased appetite.





Patient denies any headaches, blurred vision, runny nose, dizziness, difficulty 

swallowing, palpitations, joint pain, joint swelling, back pain, neck stiffness

, rashes, recent unintentional weight loss, night sweats or any focal 

neurologic deficits.





On presentation to the emergency department the patient was initially afebrile 

but tachycardic with a heart rate of 120, borderline hypotensive with a blood 

pressure of 99/76 and tachypneic with respiratory distress.  The patient was 

found to be hypoxic with oxygen saturation down to 90% on room air.  While in 

the emergency department the patient developed a fever with a temperature of 

39.6 and dropped her oxygen saturation down to 88%.  The patient underwent 

routine lab work which showed no leukocytosis but slight lymphopenia and a 

creatinine of 1.2 with no previous baseline.  The patient did appear to be 

significantly dehydrated and was given 4 L of IV fluid before she had any urine 

output.  The patient had an influenza swab that was negative and a group A 

strep that was negative.  The patient underwent a chest x-ray which was normal.

  The patient continued to have significant respiratory distress and was 

persistently hypoxic in the emergency department.  She did appear to have 

wheezes on examination and perhaps had some asthma exacerbation.  Given her 

hypoxia and borderline septic appearance the patient was admitted to the 

hospital for treatment of acute respiratory failure with hypoxia, asthma 

exacerbation and a clinical pneumonia.





- HOSPITAL COURSE


Hospital Course: 





The patient was admitted to medical surgical bed and placed on supplemental 

oxygen and IV antibiotics for her pneumonia.  She was less responsive than 

normal her first 24 hours but returned to her baseline over the course of the 

next 2 days. She is now breathing easily on room air and yesterday when she was 

ambulated with respiratory therapy her oxygen saturation on room air got as low 

as 93% but no lower. She will be discharged home on Levaquin with instructions 

to follow-up with her primary care physician next week.





- ALLERGIES


Allergies/Adverse Reactions: 


 Allergies











Allergy/AdvReac Type Severity Reaction Status Date / Time


 


Sulfa (Sulfonamide Allergy  Rash Verified 07/02/18 11:26





Antibiotics)     














- MEDICATIONS


Home Medications: 


 Ambulatory Orders











 Medication  Instructions  Recorded  Confirmed


 


Levothyroxine Sodium [Synthroid] 112 mcg PO QDAC 07/03/18 07/03/18


 


Liothyronine [Cytomel] 5 mcg PO QDAC 07/03/18 07/03/18


 


levoFLOXacin [Levaquin] 500 mg PO DAILY #7 tablet 07/06/18 














- PHYSICAL EXAM AT DISCHARGE


General Appearance: positive: No acute distress, Alert


Eyes Bilateral: positive: Normal inspection, PERRL, EOMI, No lid inflammation, 

Conjunctivae nml, No scleral icterus


ENT: positive: ENT inspection nml, Pharynx nml, No signs of dehydration


Neck: positive: Nml inspection, Thyroid nml, No JVD, Trachea midline.  negative

: Thyromegaly


Respiratory: positive: Chest non-tender, No respiratory distress, Breath sounds 

nml.  negative: Wheezes, Rales, Rhonchi


Cardiovascular: positive: Regular rate & rhythm, No murmur, No gallop


Peripheral Pulses: positive: 1+


Abdomen: positive: Non-tender, No organomegaly, Nml bowel sounds, No 

distention.  negative: Guarding, Rebound


Back: positive: Nml inspection.  negative: CVA tenderness (R), CVA tenderness (L

)


Skin: positive: Color nml, No rash, Warm, Dry.  negative: Cyanosis


Extremities: positive: Non-tender, Full ROM, Nml appearance, No pedal edema


Neurologic/Psychiatric: positive: Oriented x3, CN's nml (2-12), Motor nml, 

Sensation nml, Mood/affect nml





- LABS


Result Diagrams: 


 07/05/18 04:40





 07/05/18 04:40





- DIAGNOSTIC IMAGING


Diagnostic Imaging Results: Final report reviewed


Diagnostic Imaging Results Comments: 








EXAM: 


CHEST RADIOGRAPHY 





EXAM DATE: 7/2/2018 12:08 PM. 





CLINICAL HISTORY: Hypoxia. Tachycardia. Lethargy. 





COMPARISON: None. 





TECHNIQUE: Upright AP view. 





FINDINGS: 


Lungs/Pleura: No focal opacities evident. No pleural effusion. No 


pneumothorax. 





Mediastinum: Within exam limitations, the cardiomediastinal contour is 


normal. 





Other: Prior distal left clavicle resection. Small bone island in the 


proximal right humeral shaft. 





IMPRESSION: Normal single view chest. 











EXAM: Chest 1 View X-Ray 





DATE: 7/3/2018 9:08 AM 





CLINICAL HISTORY: F/U SOB, poss pneumonia 





COMPARISON: None. 





TECHNIQUE: Single view of the chest. 





FINDINGS: 


Lungs/Pleura: Left basilar infiltrate with trace effusion. No 


pneumothorax. 





Mediastinum: Within exam limitations, cardiomediastinal contour is normal. 





Other: None. 





IMPRESSION: 


New left basilar infiltrate and trace effusion. 








EXAM: 


CHEST RADIOGRAPHY 





EXAM DATE: 7/5/2018 06:30 AM. 





CLINICAL HISTORY: Reassess community-acquired pneumonia. 





COMPARISON: 07/03/2018. 07/02/2018. 





TECHNIQUE: 1 view. 





FINDINGS: 


Lungs/Pleura: Slight progression of left greater than right lower lobe 


airspace consolidation. No vascular congestion. No pneumothorax. 





Mediastinum: Heart size upper normal. Aorta is tortuous. 





Other: None. 





IMPRESSION: 


1. Slight progression of left greater than right lower lobe airspace 


consolidation. 








- FOLLOW UP


Follow Up: 





Follow up with Dr. Choi next week.





- TIME SPENT


Time Spent in Discharge (Minutes): 40

## 2018-07-06 NOTE — DISCHARGE PLAN
Discharge Plan


Disposition: 01 Home, Self Care


Prescriptions: 


levoFLOXacin [Levaquin] 500 mg PO DAILY #7 tablet


Diet: Regular


Activity Restrictions: Activity as Tolerated


Shower Restrictions: No


Driving Restrictions: No


Weight Bearing: Full Weight


Additional Instructions or Follow Up instructions: 


Follow up with Dr. Choi next week.


No Smoking: If you smoke, Please STOP!  Call [phone] for help.


Follow-up with: 


Provider,Other [Primary Care Provider] -

## 2020-07-18 ENCOUNTER — HOSPITAL ENCOUNTER (OUTPATIENT)
Dept: HOSPITAL 76 - EMS | Age: 60
End: 2020-07-18
Attending: SURGERY
Payer: COMMERCIAL

## 2020-07-18 ENCOUNTER — HOSPITAL ENCOUNTER (EMERGENCY)
Dept: HOSPITAL 76 - ED | Age: 60
Discharge: HOME | End: 2020-07-18
Payer: COMMERCIAL

## 2020-07-18 VITALS — DIASTOLIC BLOOD PRESSURE: 68 MMHG | SYSTOLIC BLOOD PRESSURE: 120 MMHG
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
## 2020-07-18 DIAGNOSIS — S61.511A: Primary | ICD-10-CM

## 2020-07-18 DIAGNOSIS — S61.512A: Primary | ICD-10-CM

## 2020-07-18 PROCEDURE — 99282 EMERGENCY DEPT VISIT SF MDM: CPT

## 2020-07-18 PROCEDURE — 12001 RPR S/N/AX/GEN/TRNK 2.5CM/<: CPT

## 2020-07-18 NOTE — ED PHYSICIAN DOCUMENTATION
PD HPI UPPER EXT INJURY





- Stated complaint


Stated Complaint: RT WRIST LAC





- Chief complaint


Chief Complaint: Laceration





- History obtained from


History obtained from: Patient





- History of Present Illness


Location: Right, Wrist


Type of injury: Laceration (from broken piece of ceramic vase)


Where injury occurred: Home (the vase was broken and she rubbed hand against it 

by accident. It did not break at the time. She denies small pieces nor concern 

of FB.)


Timing - onset: Today


Timing - details: Abrupt onset, Still present


Worsened by: Moving, Palpating


Associated symptoms: Other (it bled well on time of injury. Is minimally 

bleeding now.).  No: Weakness, Numbness


Contributing factors: No: Anticoagulated


Similar symptoms before: Has not had sx before





Review of Systems


Constitutional: denies: Fever, Chills


Nose: denies: Rhinorrhea / runny nose, Congestion


Throat: denies: Sore throat


Respiratory: denies: Cough


Skin: reports: Laceration (s)


Neurologic: denies: Focal weakness, Numbness





PD PAST MEDICAL HISTORY





- Past Medical History


Cardiovascular: None


Respiratory: Asthma


Neuro: None


Endocrine/Autoimmune: HyPOthyroidism


GI: None


GYN: None


: None


HEENT: None


Psych: None


Musculoskeletal: None


Derm: None





- Past Surgical History


Past Surgical History: Yes


Ortho: Other





- Present Medications


Home Medications: 


                                Ambulatory Orders











 Medication  Instructions  Recorded  Confirmed


 


Levothyroxine Sodium [Synthroid] 112 mcg PO QDAC 07/03/18 07/03/18


 


Liothyronine [Cytomel] 5 mcg PO QDAC 07/03/18 07/03/18


 


levoFLOXacin [Levaquin] 500 mg PO DAILY #7 tablet 07/06/18 














- Allergies


Allergies/Adverse Reactions: 


                                    Allergies











Allergy/AdvReac Type Severity Reaction Status Date / Time


 


Sulfa (Sulfonamide Allergy  Rash Verified 07/18/20 11:11





Antibiotics)     














- Social History


Does the pt smoke?: No


Smoking Status: Never smoker


Does the pt drink ETOH?: Yes


Does the pt have substance abuse?: No





- Immunizations


Immunizations are current?: Yes





- POLST


Patient has POLST: No


POLST Status: Full Code





PD ED PE NORMAL





- Vitals


Vital signs reviewed: Yes





- General


General: Alert and oriented X 3, No acute distress, Well developed/nourished





- Derm


Derm: Normal color, Warm and dry





- Extremities


Extremities: Other (right dorsal wrist over the radial side just to the ulnar 

side of the snuffbox, with 1.5 cm laceration with slight bleeding. It appears to

 involve superficial vein on dorsum of hand. Otherwise just down to fatty tissue

 and no FB. )





- Neuro


Neuro: Alert and oriented X 3, No motor deficit, No sensory deficit





Results





- Vitals


Vitals: 


                               Vital Signs - 24 hr











  07/18/20 07/18/20





  11:12 12:19


 


Temperature 36.7 C 36.7 C


 


Heart Rate 82 60


 


Respiratory 16 16





Rate  


 


Blood Pressure 132/73 H 120/68


 


O2 Saturation 100 100








                                     Oxygen











O2 Source                      Room air

















Procedures





- Laceration (location)


  ** dorsal right wrist


Length in cm: 1.5


Wound type: Linear, Into subcut fat, Clean


Neurovascular status: Sensory intact, Motor intact


Tendon involvement: Tendon intact.  No: Tendon Injury


Anesthesia: Lidocaine 1% with epi


Wound Preparation: Irrigated copiously NS, Wound explored, To the base.  No: FB 

identified


Skin layer closure: Nylon, Running, Size #-0 - enter number (4), Sutures - enter

 # (5)


Other: Patient tolerated well, No complications, Neurovascular intact, Dressing 

applied, Tetanus UTD


Complexity: Simple





PD MEDICAL DECISION MAKING





- ED course


Complexity details: considered differential, d/w patient





Departure





- Departure


Disposition: 01 Home, Self Care


Clinical Impression: 


Laceration of right wrist


Qualifiers:


 Encounter type: initial encounter Qualified Code(s): S61.511A - Laceration 

without foreign body of right wrist, initial encounter





Condition: Stable


Record reviewed to determine appropriate education?: Yes


Instructions:  ED Laceration Hand


Comments: 


It is okay to wash and shower. Clean off the wound twice a day with soap and 

water, or peroxide and water. Apply some antibiotic ointment to it to keep it 

moist. Also to watch for signs of infection such as purulence, redness or 

increasing pain. Return to your primary care or the ER at the specified time for

 suture removal.





Suture removal 8 to 10 days.





Tylenol ibuprofen as needed for pains.





Activity as tolerated.


Discharge Date/Time: 07/18/20 12:24